# Patient Record
Sex: FEMALE | Race: WHITE
[De-identification: names, ages, dates, MRNs, and addresses within clinical notes are randomized per-mention and may not be internally consistent; named-entity substitution may affect disease eponyms.]

---

## 2024-09-10 ENCOUNTER — NON-APPOINTMENT (OUTPATIENT)
Age: 69
End: 2024-09-10

## 2024-09-10 ENCOUNTER — APPOINTMENT (OUTPATIENT)
Dept: CARDIOLOGY | Facility: CLINIC | Age: 69
End: 2024-09-10
Payer: MEDICARE

## 2024-09-10 VITALS — HEART RATE: 70 BPM | HEIGHT: 64 IN | OXYGEN SATURATION: 95 % | BODY MASS INDEX: 29.02 KG/M2 | WEIGHT: 170 LBS

## 2024-09-10 VITALS — DIASTOLIC BLOOD PRESSURE: 90 MMHG | SYSTOLIC BLOOD PRESSURE: 130 MMHG | HEART RATE: 72 BPM

## 2024-09-10 DIAGNOSIS — Z78.9 OTHER SPECIFIED HEALTH STATUS: ICD-10-CM

## 2024-09-10 DIAGNOSIS — E78.5 HYPERLIPIDEMIA, UNSPECIFIED: ICD-10-CM

## 2024-09-10 DIAGNOSIS — R03.0 ELEVATED BLOOD-PRESSURE READING, W/OUT DIAGNOSIS OF HYPERTENSION: ICD-10-CM

## 2024-09-10 DIAGNOSIS — Z80.1 FAMILY HISTORY OF MALIGNANT NEOPLASM OF TRACHEA, BRONCHUS AND LUNG: ICD-10-CM

## 2024-09-10 PROBLEM — Z00.00 ENCOUNTER FOR PREVENTIVE HEALTH EXAMINATION: Status: ACTIVE | Noted: 2024-09-10

## 2024-09-10 PROCEDURE — 99204 OFFICE O/P NEW MOD 45 MIN: CPT

## 2024-09-10 PROCEDURE — 93000 ELECTROCARDIOGRAM COMPLETE: CPT

## 2024-09-10 NOTE — REASON FOR VISIT
[Other: ____] : [unfilled] [FreeTextEntry1] : This is a 68-year-old woman who presents for cardiac evaluation.  The patient has no known significant medical history.  She states that her blood pressure was somewhat elevated several weeks ago then she was rechecked by her doctor and was told it was normal.  She has no history of diabetes or smoking she is an occasional alcohol user.  Most recent lipid profile reveals a total cholesterol 251 HDL 64  family history significant for a mother who was a smoker and a diabetic and did have bypass surgery and  at age 83 her father  of lung cancer.

## 2024-09-10 NOTE — ASSESSMENT
[FreeTextEntry1] : In summary, the patient is a 68-year-old woman with hyperlipidemia and borderline blood pressure.  We have gone over her cardiac risk based on the ACC AHA risk calculator.  The optimal risk is Is 5.5%.  Her risk is somewhere between 8.3% and 11% depending on whether or not she is truly hypertensive.  With medical intervention this could be reduced to anywhere between 6.2 and 8.3%.  Towards that end the patient will undergo exercise stress testing to rule out ischemic heart disease.  If indeed her stress test is normal would do calcium scoring to further risk stratify her and decide on necessity of lipid-lowering and possibly antihypertensive therapy

## 2024-10-28 ENCOUNTER — OFFICE (OUTPATIENT)
Facility: LOCATION | Age: 69
Setting detail: OPHTHALMOLOGY
End: 2024-10-28
Payer: MEDICARE
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
## 2024-10-28 PROCEDURE — 33333 MEDICAL RECORDS FEE: CPT | Performed by: OPHTHALMOLOGY

## 2024-11-18 ENCOUNTER — APPOINTMENT (OUTPATIENT)
Dept: CARDIOLOGY | Facility: CLINIC | Age: 69
End: 2024-11-18
Payer: MEDICARE

## 2024-11-18 DIAGNOSIS — R94.39 ABNORMAL RESULT OF OTHER CARDIOVASCULAR FUNCTION STUDY: ICD-10-CM

## 2024-11-18 DIAGNOSIS — R03.0 ELEVATED BLOOD-PRESSURE READING, W/OUT DIAGNOSIS OF HYPERTENSION: ICD-10-CM

## 2024-11-18 DIAGNOSIS — E78.5 HYPERLIPIDEMIA, UNSPECIFIED: ICD-10-CM

## 2024-11-18 PROCEDURE — 93015 CV STRESS TEST SUPVJ I&R: CPT

## 2024-11-18 PROCEDURE — 99213 OFFICE O/P EST LOW 20 MIN: CPT | Mod: 25
